# Patient Record
Sex: FEMALE | Race: WHITE
[De-identification: names, ages, dates, MRNs, and addresses within clinical notes are randomized per-mention and may not be internally consistent; named-entity substitution may affect disease eponyms.]

---

## 2018-06-18 ENCOUNTER — HOSPITAL ENCOUNTER (EMERGENCY)
Dept: HOSPITAL 62 - ER | Age: 43
LOS: 1 days | Discharge: HOME | End: 2018-06-19
Payer: COMMERCIAL

## 2018-06-18 DIAGNOSIS — Z98.51: ICD-10-CM

## 2018-06-18 DIAGNOSIS — Z87.19: ICD-10-CM

## 2018-06-18 DIAGNOSIS — R10.11: Primary | ICD-10-CM

## 2018-06-18 DIAGNOSIS — I10: ICD-10-CM

## 2018-06-18 LAB
ADD MANUAL DIFF: NO
ALBUMIN SERPL-MCNC: 4.1 G/DL (ref 3.5–5)
ALP SERPL-CCNC: 80 U/L (ref 38–126)
ALT SERPL-CCNC: 22 U/L (ref 9–52)
ANION GAP SERPL CALC-SCNC: 11 MMOL/L (ref 5–19)
APPEARANCE UR: (no result)
APTT PPP: YELLOW S
AST SERPL-CCNC: 14 U/L (ref 14–36)
BASOPHILS # BLD AUTO: 0.1 10^3/UL (ref 0–0.2)
BASOPHILS NFR BLD AUTO: 0.8 % (ref 0–2)
BILIRUB DIRECT SERPL-MCNC: 0.4 MG/DL (ref 0–0.4)
BILIRUB SERPL-MCNC: 0.7 MG/DL (ref 0.2–1.3)
BILIRUB UR QL STRIP: NEGATIVE
BUN SERPL-MCNC: 10 MG/DL (ref 7–20)
CALCIUM: 9.4 MG/DL (ref 8.4–10.2)
CHLORIDE SERPL-SCNC: 104 MMOL/L (ref 98–107)
CO2 SERPL-SCNC: 27 MMOL/L (ref 22–30)
EOSINOPHIL # BLD AUTO: 0.1 10^3/UL (ref 0–0.6)
EOSINOPHIL NFR BLD AUTO: 1 % (ref 0–6)
ERYTHROCYTE [DISTWIDTH] IN BLOOD BY AUTOMATED COUNT: 14 % (ref 11.5–14)
GLUCOSE SERPL-MCNC: 88 MG/DL (ref 75–110)
GLUCOSE UR STRIP-MCNC: NEGATIVE MG/DL
HCT VFR BLD CALC: 39.9 % (ref 36–47)
HGB BLD-MCNC: 13.3 G/DL (ref 12–15.5)
KETONES UR STRIP-MCNC: NEGATIVE MG/DL
LIPASE SERPL-CCNC: 64.8 U/L (ref 23–300)
LYMPHOCYTES # BLD AUTO: 3.4 10^3/UL (ref 0.5–4.7)
LYMPHOCYTES NFR BLD AUTO: 26.4 % (ref 13–45)
MCH RBC QN AUTO: 27.8 PG (ref 27–33.4)
MCHC RBC AUTO-ENTMCNC: 33.4 G/DL (ref 32–36)
MCV RBC AUTO: 83 FL (ref 80–97)
MONOCYTES # BLD AUTO: 0.6 10^3/UL (ref 0.1–1.4)
MONOCYTES NFR BLD AUTO: 4.6 % (ref 3–13)
NEUTROPHILS # BLD AUTO: 8.6 10^3/UL (ref 1.7–8.2)
NEUTS SEG NFR BLD AUTO: 67.2 % (ref 42–78)
NITRITE UR QL STRIP: NEGATIVE
PH UR STRIP: 5 [PH] (ref 5–9)
PLATELET # BLD: 388 10^3/UL (ref 150–450)
POTASSIUM SERPL-SCNC: 4.4 MMOL/L (ref 3.6–5)
PROT SERPL-MCNC: 7.2 G/DL (ref 6.3–8.2)
PROT UR STRIP-MCNC: NEGATIVE MG/DL
RBC # BLD AUTO: 4.8 10^6/UL (ref 3.72–5.28)
SODIUM SERPL-SCNC: 142.2 MMOL/L (ref 137–145)
SP GR UR STRIP: 1.03
TOTAL CELLS COUNTED % (AUTO): 100 %
UROBILINOGEN UR-MCNC: NEGATIVE MG/DL (ref ?–2)
WBC # BLD AUTO: 12.8 10^3/UL (ref 4–10.5)

## 2018-06-18 PROCEDURE — 96372 THER/PROPH/DIAG INJ SC/IM: CPT

## 2018-06-18 PROCEDURE — 36415 COLL VENOUS BLD VENIPUNCTURE: CPT

## 2018-06-18 PROCEDURE — 83690 ASSAY OF LIPASE: CPT

## 2018-06-18 PROCEDURE — 76705 ECHO EXAM OF ABDOMEN: CPT

## 2018-06-18 PROCEDURE — 81001 URINALYSIS AUTO W/SCOPE: CPT

## 2018-06-18 PROCEDURE — 81025 URINE PREGNANCY TEST: CPT

## 2018-06-18 PROCEDURE — 99284 EMERGENCY DEPT VISIT MOD MDM: CPT

## 2018-06-18 PROCEDURE — 85025 COMPLETE CBC W/AUTO DIFF WBC: CPT

## 2018-06-18 PROCEDURE — 80053 COMPREHEN METABOLIC PANEL: CPT

## 2018-06-18 NOTE — ER DOCUMENT REPORT
ED Medical Screen (RME)





- General


Chief Complaint: Abdominal Pain


Stated Complaint: ABDOMINAL PAIN


Time Seen by Provider: 18 20:01


Mode of Arrival: Ambulatory


Notes: 





Patient reports that she was seen at [facility] today for RUQ pain.  Reports that she 

was sent here for U/S.  Abdominal pain started last night and has been 

constant.  Denies nay N/V/D or fevers.  Denies nay urinary symptoms.  Normal BM 

yesterday. 





I have greeted and performed a rapid initial assessment of this patient.  A 

comprehensive ED assessment and evaluation of the patient, analysis of test 

results and completion of the medical decision making process will be conducted 

by additional ED providers.


TRAVEL OUTSIDE OF THE [place] IN LAST 30 DAYS: No





- Related Data


Allergies/Adverse Reactions: 


 





No Known Allergies Allergy (Unverified 14 11:17)


 











Past Medical History





- General


Information source: Patient





- Social History


Cigarette use (# per day): No


Frequency of alcohol use: None


Drug Abuse: None


Lives with: Family





- Past Medical History


Cardiac Medical History: Reports: Hx Hypertension


Neurological Medical History: Reports: Hx Seizures - with first pregnancy in 

 - eclampsia.  Denies: Hx Cerebrovascular Accident


GI Medical History: Reports: Hx Gastroesophageal Reflux Disease - occ - tums.  

Denies: Hx Hiatal Hernia, Hx Ulcer


Psychiatric Medical History: Reports: Hx Anxiety


Infectious Medical History: Denies: Hx HIV


Past Surgical History: Reports: Hx  Section - x2, Hx Tubal Ligation





- Immunizations


Immunizations up to date: Yes


Hx Diphtheria, Pertussis, Tetanus Vaccination: Yes





Review of Systems





- Review of Systems


Constitutional: No symptoms reported


EENT: No symptoms reported


Cardiovascular: No symptoms reported


Respiratory: No symptoms reported


Gastrointestinal: Abdominal pain.  denies: Diarrhea, Nausea, Vomiting, 

Constipation


Genitourinary: No symptoms reported


Female Genitourinary: No symptoms reported


Musculoskeletal: No symptoms reported


Skin: No symptoms reported


Hematologic/Lymphatic: No symptoms reported


Neurological/Psychological: No symptoms reported





Physical Exam





- Vital signs


Vitals: 





 











Temp Pulse Resp BP Pulse Ox


 


 98.8 F   64   16   142/67 H  100 


 


 18 18:30  18 18:30  18 18:30  18 18:30  18 18:30














- Notes


Notes: 





Abdomen:  Soft, TTP RUQ, +murphys sign.





Course





- Vital Signs


Vital signs: 





 











Temp Pulse Resp BP Pulse Ox


 


 98.8 F   64   16   142/67 H  100 


 


 18 18:30  18 18:30  18 18:30  18 18:30  18 18:30














Doctor's Discharge





- Discharge


Referrals: 


CASIMIRO BOWER MD [Primary Care Provider] - Follow up as needed

## 2018-06-18 NOTE — RADIOLOGY REPORT (SQ)
EXAM DESCRIPTION:  U/S ABDOMEN LIMITED W/O DOP



COMPLETED DATE/TIME:  6/18/2018 10:07 pm



REASON FOR STUDY:  RUQ pain



COMPARISON:  None.



TECHNIQUE:  Dynamic and static grayscale images acquired of the abdomen and recorded on PACS. Additio
nal selected color Doppler and spectral images recorded.



LIMITATIONS:  None.



FINDINGS:  PANCREAS: Not seen.

LIVER: 16 cm.  Normal echotexture.

LIVER VASCULATURE: Normal directional flow of the main portal vein and hepatic veins.

GALLBLADDER: No stones. Normal wall thickness. No pericholecystic fluid.

ULTRASOUND-DETECTED DOHERTY'S SIGN: Negative.

INTRAHEPATIC DUCTS AND COMMON DUCT: CBD and intrahepatic ducts normal caliber. No filling defects.

INFERIOR VENA CAVA: Not seen.

AORTA: No aneurysm.

RIGHT KIDNEY:  Normal size, 10.2 cm. Normal echogenicity. No solid or suspicious masses. No hydroneph
rosis. No calcifications.

PERITONEAL AND RIGHT PLEURAL SPACE: No ascites or effusions.

OTHER: No other significant findings.



IMPRESSION:  NORMAL RIGHT UPPER QUADRANT ULTRASOUND.



TECHNICAL DOCUMENTATION:  JOB ID:  [id number]

 2011 [employer]- All Rights Reserved



Reading location - IP/workstation name: [email or web address]

## 2018-06-19 VITALS — SYSTOLIC BLOOD PRESSURE: 145 MMHG | DIASTOLIC BLOOD PRESSURE: 94 MMHG

## 2018-06-19 NOTE — ER DOCUMENT REPORT
ED General





- General


Chief Complaint: Abdominal Pain


Stated Complaint: ABDOMINAL PAIN


Time Seen by Provider: 18 20:01


Mode of Arrival: Ambulatory


Information source: Patient


Notes: 





42-year-old female presents with complaints of sudden abdominal pain as of this 

morning.  Patient denies any fevers or chills denies any nausea vomiting or 

diarrhea.  Patient was seen by her primary care physician and sent in for 

ultrasound and further workup.  Patient denies any previous similar episodes


TRAVEL OUTSIDE OF THE [place] IN LAST 30 DAYS: No





- HPI


Onset: This morning


Onset/Duration: Sudden


Quality of pain: Sharp


Severity: Mild


Pain Level: 1


Associated symptoms: None


Exacerbated by: Movement


Relieved by: Denies


Similar symptoms previously: Yes


Recently seen / treated by doctor: Yes





- Related Data


Allergies/Adverse Reactions: 


 





No Known Allergies Allergy (Unverified 14 11:17)


 











Past Medical History





- General


Information source: Patient





- Social History


Smoking Status: Never Smoker


Cigarette use (# per day): No


Chew tobacco use (# tins/day): No


Smoking Education Provided: No


Frequency of alcohol use: None


Drug Abuse: None


Lives with: Family


Family History: Reviewed & Not Pertinent





- Past Medical History


Cardiac Medical History: Reports: Hx Hypertension


Neurological Medical History: Reports: Hx Seizures - with first pregnancy in 

 - eclampsia.  Denies: Hx Cerebrovascular Accident


GI Medical History: Reports: Hx Gastroesophageal Reflux Disease - occ - tums.  

Denies: Hx Hiatal Hernia, Hx Ulcer


Psychiatric Medical History: Reports: Hx Anxiety


Infectious Medical History: Denies: Hx HIV


Past Surgical History: Reports: Hx  Section - x2, Hx Tubal Ligation





- Immunizations


Immunizations up to date: Yes


Hx Diphtheria, Pertussis, Tetanus Vaccination: Yes





Review of Systems





- Review of Systems


Notes: 





REVIEW OF SYSTEMS:


CONSTITUTIONAL :  Denies fever,  chills, or sweats.  Denies recent illness.


EENT:   Denies eye, ear, throat, or mouth pain or symptoms.  Denies nasal or 

sinus congestion or discharge.  Denies throat, tongue, or mouth swelling or 

difficulty swallowing.


CARDIOVASCULAR:  Denies chest pain.  Denies palpitations or racing or irregular 

heart beat.  Denies ankle edema.


RESPIRATORY:  Denies cough, cold, or chest congestion.  Denies shortness of 

breath, difficulty breathing, or wheezing.


GASTROINTESTINAL: Admits to abdominal pain


GENITOURINARY:  Denies difficulty urinating, painful urination, burning, 

frequency, blood in urine, or discharge.


FEMALE  GENITOURINARY:  Denies vaginal bleeding, heavy or abnormal periods, 

irregular periods.  Denies vaginal discharge or odor. 


MUSCULOSKELETAL:  Denies back or neck pain or stiffness.  Denies joint pain or 

swelling.


SKIN:   Denies rash, lesions or sores.


HEMATOLOGIC :   Denies easy bruising or bleeding.


LYMPHATIC:  Denies swollen, enlarged glands.


NEUROLOGICAL:  Denies confusion or altered mental status.  Denies passing out 

or loss of consciousness.  Denies dizziness or lightheadedness.  Denies 

headache.  Denies weakness or paralysis or loss of use of either side.  Denies 

problems with gait or speech.  Denies sensory loss, numbness, or tingling.  

Denies seizures.


PSYCHIATRIC:  Denies anxiety or stress.  Denies depression, suicidal ideation, 

or homicidal ideation.





ALL OTHER SYSTEMS REVIEWED AND NEGATIVE.











PHYSICAL EXAMINATION:





GENERAL: Well-appearing, well-nourished and in no acute distress.





HEAD: Atraumatic, normocephalic.





EYES: Pupils equal round and reactive to light, extraocular movements intact, 

conjunctiva are normal.





ENT: Nares patent, oropharynx clear without exudates.  Moist mucous membranes.





NECK: Normal range of motion, supple without lymphadenopathy





LUNGS: Breath sounds clear to auscultation bilaterally and equal.  No wheezes 

rales or rhonchi.





HEART: Regular rate and rhythm without murmurs





ABDOMEN: Soft, tender in the right upper quadrant





Female : deferred





Musculoskeletal: Normal range of motion, no pitting or edema.  No cyanosis.





NEUROLOGICAL: Cranial nerves grossly intact.  Normal speech, normal gait.  

Normal sensory, motor exams





PSYCH: Normal mood, normal affect.





SKIN: Warm, Dry, normal turgor, no rashes or lesions noted.

















Dictation was performed using Dragon voice recognition software





Physical Exam





- Vital signs


Vitals: 


 











Temp Pulse Resp BP Pulse Ox


 


 98.8 F   64   16   142/67 H  100 


 


 18 18:30  18 18:30  18 18:30  18 18:30  18 18:30














Course





- Re-evaluation


Re-evalutation: 





18 00:21


Patient does have tenderness upon palpation, she is in no distress at this time 

the she will be treated with anti-inflammatories ultrasound noted no 

significant abnormality she does have mild white count elevation which I 

believe is secondary to pain, patient believes her symptoms are more related to 

muscle strain which may be








18 00:36


After performing a Medical Screening Examination, I estimate there is LOW risk 

for ACUTE APPENDICITIS, BOWEL OBSTRUCTION, ACUTE CHOLECYSTITIS, PERFORATED 

DIVERTICULITIS, INCARCERATED HERNIA, PANCREATITIS, PELVIC INFLAMMATORY DISEASE, 

PERFORATED ULCER, ECTOPIC PREGNANCY, or TUBO-OVARIAN ABSCESS, thus I consider 

the discharge disposition reasonable. Also, there is no evidence or peritonitis

, sepsis, or toxicity. I have reevaluated this patient multiple times and no 

significant life threatening changes are noted. The patient and I have 

discussed the diagnosis and risks, and we agree with discharging home with 

close follow-up with the understanding that symptoms and presentations can 

change. We also discussed returning to the Emergency Department immediately if 

new or worsening symptoms occur. We have discussed the symptoms which are most 

concerning (e.g., bloody stool, fever, changing or worsening pain, vomiting) 

that necessitate immediate return.





- Vital Signs


Vital signs: 


 











Temp Pulse Resp BP Pulse Ox


 


 98.8 F   64   16   142/67 H  100 


 


 18 18:30  18 18:30  18 18:30  18 18:30  18 18:30














- Laboratory


Result Diagrams: 


 18 19:45





 18 19:45


Laboratory results interpreted by me: 


 











  18





  19:45


 


WBC  12.8 H


 


Absolute Neutrophils  8.6 H














- Diagnostic Test


Radiology reviewed: Image reviewed - Ultrasound limited right upper quadrant 

notes no acute abnormality, Reports reviewed





Discharge





- Discharge


Clinical Impression: 


 RUQ pain





Condition: Stable


Disposition: HOME, SELF-CARE


Instructions:  Gallbladder Disease ([facility])


Prescriptions: 


Hydrocodone/Acetaminophen [Norco 5-325 mg Tablet] 1 tab PO Q6 #10 tablet


Referrals: 


CASIMIRO BOWER MD [ACTIVE STAFF] - Follow up as needed


JOHNATHAN MAY MD [ACTIVE STAFF] - Follow up tomorrow

## 2018-07-13 ENCOUNTER — HOSPITAL ENCOUNTER (OUTPATIENT)
Dept: HOSPITAL 62 - RAD | Age: 43
End: 2018-07-13
Attending: FAMILY MEDICINE
Payer: COMMERCIAL

## 2018-07-13 DIAGNOSIS — R10.9: Primary | ICD-10-CM

## 2018-07-13 PROCEDURE — A9537 TC99M MEBROFENIN: HCPCS

## 2018-07-13 PROCEDURE — 78227 HEPATOBIL SYST IMAGE W/DRUG: CPT

## 2018-07-13 NOTE — RADIOLOGY REPORT (SQ)
EXAM DESCRIPTION:  NM HIDA SCAN WITH CCK



COMPLETED DATE/TIME:  7/13/2018 2:54 pm



REASON FOR STUDY:  UNSPEC ABD PAIN (R10.9) R10.9  UNSPECIFIED ABDOMINAL PAIN



COMPARISON:  None.



RADIONUCLIDE AND DOSE:  DOSAGE RADIONUCLIDE: 5 millicuries Tc99m Mebrofenin.

DOSAGE CCK: 2.5 micrograms.

DOSAGE MORPHINE: Not required.

The route of agent administration: Intravenous



TECHNIQUE:  Serial imaging right upper quadrant up to 60 minutes following injection of radionuclide.
  CCK injected after gallbladder visualized.



LIMITATIONS:  None.



FINDINGS:  LIVER: Normal visualization without areas of photopenia.

INTRAHEPATIC BILE DUCTS: Normal size and no delay in visualization.

COMMON BILE DUCT: Normal without dilatation.

GALLBLADDER:   Normal visualization.  Calculated ejection fraction of 30%. Normal range is greater th
an 35%.

PHYSICAL RESPONSE:  Patients presenting complaint was not reproduced.

OTHER: No other significant finding.



IMPRESSION:  Mildly decreased gallbladder ejection function of 30% where 35% or greater is considered
 normal.  Patent common bile duct and cystic duct.



TECHNICAL DOCUMENTATION:  JOB ID:  [id number]

 2011 [employer]- All Rights Reserved



Reading location - IP/workstation name: [email or web address]